# Patient Record
Sex: FEMALE | Race: WHITE | Employment: OTHER | URBAN - METROPOLITAN AREA
[De-identification: names, ages, dates, MRNs, and addresses within clinical notes are randomized per-mention and may not be internally consistent; named-entity substitution may affect disease eponyms.]

---

## 2020-01-01 ENCOUNTER — HOSPITAL ENCOUNTER (EMERGENCY)
Age: 75
End: 2020-08-13
Attending: EMERGENCY MEDICINE
Payer: MEDICARE

## 2020-01-01 PROCEDURE — 2500000003 HC RX 250 WO HCPCS

## 2020-01-01 PROCEDURE — 92950 HEART/LUNG RESUSCITATION CPR: CPT

## 2020-01-01 PROCEDURE — 31500 INSERT EMERGENCY AIRWAY: CPT

## 2020-01-01 PROCEDURE — 99282 EMERGENCY DEPT VISIT SF MDM: CPT

## 2020-01-01 PROCEDURE — 6360000002 HC RX W HCPCS

## 2020-01-01 PROCEDURE — 99281 EMR DPT VST MAYX REQ PHY/QHP: CPT

## 2020-01-01 PROCEDURE — 2580000003 HC RX 258

## 2020-08-13 NOTE — ED TRIAGE NOTES
Spoke with son, Juana Holcomb, who stated he was here visiting with his mom. States she had a fall about 6 months ago while visiting daughter in [place] and was hospitalized in [place] at that time. [facility] she recently returned home. He states she was fairly healthy up until then only taking atenolol for HTN and metformin for diabetes. Son is unable to provide family Dr but will check when he gets home for information. Will also call with  home information. [phone].

## 2020-08-13 NOTE — ED PROVIDER NOTES
Department of Emergency Medicine   ED  Provider Note  Admit Date/RoomTime: 8/13/2020  7:38 PM  ED Room: BOLIVAR/BOLIVAR                8/13/20  7:43 PM EDT      HISTORY OF PRESENT ILLNESS:  (Nurses Notes Reviewed)    Chief Complaint:   Cardiac Arrest (witnessed, down time approximately 30 minutes prior to arrival)      Source of history provided by:  EMS personnel. History/Exam Limitations: none. Renny Martines is a 66 y.o. old female presenting to the emergency department, for cardiac arrest, which occurred approximately 30 minutes minute(s) prior to arrival.   She has a history of hypertension, diabetes. Code Status on file: No Order. Duration:  Down time from arrest until ambulance arrival unknown. Total Time from arrest until hospital arrival 15 minutes. Onset:  sudden. Witnessed: yes. Available History:      Prior Cardiac Disease:   Unknown. Drug Use/Overdose ? Unknown. Drowning ? No.     GI Bleeding ? Unknown. Hypothermia ? No.     Other:   Complained of dyspnea prior to arrest     Prehospital Care:  epinephrine, Samuel airway. Initial Rhythm: PEA. Prehospital Treatment:       CPR:   yes. Intubation:   no     IV Established:   no     Defibrillation:   no     External Pacer:   no     Epinephrine:   yes     Atropine:   no     Response to Treatment:  Transient return of pulse: No.                                               Sustained return of pulse:  No.  Associated Signs and Symptoms (preceding arrest):  unknown. Other History:   unknown. PAST MEDICAL, SURGICAL, SOCIAL AND FAMILY HISTORY SECTION    Past Medical History:  has a past medical history of Diabetes mellitus ([address] and Hypertension. Past Surgical History:  has no past surgical history on file. Social History:      Family History: family history is not on file. The patients home medications have been reviewed. Allergies: Patient has no allergy information on record.     Review of Systems: Pertinent positives and negatives are stated within HPI, all other systems reviewed and are negative. ROS limited by patient mental status        PHYSICAL EXAM:   General: Unresponsive. Cyanotic. Head: Atraumatic. Emesis to face  Eyes: Fixed and dilated  ENT: Airway patent. Samuel airway in place  Cardiovascular: Heart sounds are Absent. Pulses are Absent. Respiratory: No spontaneous respirations. Equal breath sounds with controlled ventilation. Abdominal: not distended. Musculoskeletal: No deformities. Skin: Pallor, mottled. Neurological: Unresponsive. Neurological exam limited due to clinical condition.       ------------------------------------------------ RESULTS ---------------------------------------------------    LABS  No results found for this visit on 08/13/20. RADIOLOGY  No orders to display         ---------------------------- NURSING NOTES AND VITALS REVIEWED -------------------------   The nursing notes within the ED encounter and vital signs as below have been reviewed. There were no vitals taken for this visit. Oxygen Saturation Interpretation: Abnormal      ------------------------------------------PROGRESS NOTES -------------------------------------------    ED COURSE MEDICATIONS:              Medications - No data to display    Medical Decision Making/ED Course:    ED Course as of Aug 15 1532   Thu Aug 13, 2020   1942 Time of death 80    []   [phone] Discussed case with Dr. Veronica Villanueva, he will check his patient charts in the morning, and if the patient is his, he will sign the death certificate. []      ED Course User Index  [MF] Ankit Guard, DO         This patient's ED course included: a personal history and physicial examination, IV medications, cardiac monitoring, continuous pulse oximetry and complex medical decision making and emergency management. Patient given continuous CPR with several rounds of epinephrine as well as bicarb, IV fluids.   Patient remained in alternating PEA and asystole with no cardiac wall motion on ultrasound. He will remain completely unresponsive with fixed and dilated pupils. Decision was made to call time of death at 80. CONSULTATIONS:            Spoke with Dr. Summer Peter at 1621. Results of consult: He will check to see if patient is his. PROCEDURES:           Intubation Procedure Note    Indication: Respiratory failure and comatose state    Consent: Unable to be obtained due to the emergent nature of this procedure. Medications Used: None    Procedure: The patient was placed in the appropriate position. Cricoid pressure was not required. Intubation was performed by indirect laryngoscopy using a glidescope and an 8.0 cuffed endotracheal tube. The cuff was then inflated and the tube was secured appropriately at a distance of 21 cm to the dental ridge. Initial confirmation of placement included an end tidal CO2 detector, absence of sounds over the stomach and tube fogging. The patient tolerated the procedure well. Complications: None    BEDSIDE CARDIAC ULTRASOUND    Risks, benefits and alternatives (for applicable procedures below) described. Performed By: Ariella Dover DO. Indication(s):  arrest  Informed consent: Unable to be obtained due to the emergent nature of this procedure. .  Procedural Quadrants/Interpretations:     SUBXYPHOID/CARDIAC:  No significant pericardial fluid. No cardiac wall motion. Counseling:   I have spoken with the spouse regarding the patient's treatment/condition at this time. --------------------------------------- IMPRESSION & DISPOSITION --------------------------------     IMPRESSION:  1. Cardiac arrest ([facility] [address]            DISPOSITION:  Disposition: Other Disposition: . Patient condition is .              Ariella Dover DO  08/15/20 8713

## 2020-08-13 NOTE — ED NOTES
Bed: 16  Expected date:   Expected time:   Means of arrival:   Comments:  1578 Rigo Borges RN  08/13/20 5317

## 2020-08-14 NOTE — ED NOTES
Spoke with Tamara at [address] [facility] Tamar Siegel, will be here at 1430 to transport patient.       Miracle Marcelino RN  08/14/20 4117

## 2020-08-14 NOTE — ED NOTES
Spoke with Arti Ramos in coroners office informed her that a physician could no be found to sign death certificate, was informed that Dr. Beulha Ceballos would sign and is ok to release body to [facility].      Jerson Muro RN  08/14/20 5338

## 2020-08-14 NOTE — ED NOTES
This nurse spoke with Dr. Rodney Murdock stated he was not familiar with this pt. And had no chart on her. This nurse will contact coroners office to inform.      Jeana Shepherd RN  08/14/20 0688

## 2020-08-14 NOTE — ED NOTES
Spoke with son Reynaldo Zurita questioned if any personal belongings of pt. Was here, nothing at nurse station, had staff go to [facility] retrieved one bracelet, and 2 earrings, pt. Also had on 2 rings that we are not able to remove. Reynaldo Jorgegricelda made aware of this states family will  belongings tomorrow and will have  home remove rings.      Vincent Pisano RN  [phone]

## 2020-08-14 NOTE — ED NOTES
[facility] notified of patient death, will be a hold for tissue donation. Spoke with [facility]. [place] notify [place] [facility]      Trice Fortune RN  08/13/20 2042       Trice Fortune RN  08/13/20 2050

## 2020-08-14 NOTE — ED NOTES
Patient arrived to ED via [facility] with patient who was a witnessed cardiac arrest.  Per EMS family had no medical hx for patient other than HTN and DM. Stated patient became short of breath and unresponsive. Patient arrived with Jasper Rodriguez performing compressions, phan airway placed by EMS, IO in left lower leg. Per EMS cardiac monitor showed PEA for them. Patient asystole on arrival to ED. 1 epi administered by EMS. Dr. Oralia Lan, RN, [facility], RN, [place], RN, Dora Montelongo LPN, Nicole Guerra, Lyn Mott, Respiratory and myself at bedside. 20 g peripheral IV placed in right AC, #8 ETT placed by Dr. Michelle Salas 22 cm at the lip. 1930 epi administered, asystole on monitor, gail resumed. Bag respirations being administered by respiratory.   1933 bi carb administered  1935 epi administered, pulse check showed no pulse, gail resumed, bagging resumed. 1936 calcium administered, US showed no cardiac activity per Dr. Michelle Salas. Calcium administered at this time. End tidal 8.   1938  Epi administered. Patient remains pulseless, End tidal 9. Compressions being performed by Jasper Rodriguez machine and bag respirations being administered by respiratory therapist during code. Approximately 500 ml NS administered. TOKENYETTA 1940.      Yen Nice RN  08/13/20 [address] [address] [place], RN  08/13/20 2013